# Patient Record
Sex: FEMALE | Race: WHITE | NOT HISPANIC OR LATINO | Employment: FULL TIME | URBAN - NONMETROPOLITAN AREA
[De-identification: names, ages, dates, MRNs, and addresses within clinical notes are randomized per-mention and may not be internally consistent; named-entity substitution may affect disease eponyms.]

---
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
## 2024-09-24 ENCOUNTER — TRANSCRIBE ORDERS (OUTPATIENT)
Dept: LAB | Facility: HOSPITAL | Age: 24
End: 2024-09-24
Payer: COMMERCIAL

## 2024-09-24 ENCOUNTER — LAB (OUTPATIENT)
Dept: LAB | Facility: HOSPITAL | Age: 24
End: 2024-09-24
Payer: COMMERCIAL

## 2024-09-24 DIAGNOSIS — Z3A.13 13 WEEKS GESTATION OF PREGNANCY: ICD-10-CM

## 2024-09-24 DIAGNOSIS — Z3A.13 13 WEEKS GESTATION OF PREGNANCY: Primary | ICD-10-CM

## 2024-09-24 LAB
ABO GROUP BLD: NORMAL
AMPHET+METHAMPHET UR QL: NEGATIVE
AMPHETAMINES UR QL: NEGATIVE
BACTERIA UR QL AUTO: NORMAL /HPF
BARBITURATES UR QL SCN: NEGATIVE
BENZODIAZ UR QL SCN: NEGATIVE
BILIRUB UR QL STRIP: NEGATIVE
BLD GP AB SCN SERPL QL: NEGATIVE
BUPRENORPHINE SERPL-MCNC: NEGATIVE NG/ML
CANNABINOIDS SERPL QL: NEGATIVE
CLARITY UR: CLEAR
COCAINE UR QL: NEGATIVE
COLOR UR: YELLOW
DEPRECATED RDW RBC AUTO: 39.7 FL (ref 37–54)
ERYTHROCYTE [DISTWIDTH] IN BLOOD BY AUTOMATED COUNT: 12.4 % (ref 12.3–15.4)
FENTANYL UR-MCNC: NEGATIVE NG/ML
GLUCOSE UR STRIP-MCNC: NEGATIVE MG/DL
HBV SURFACE AG SERPL QL IA: NORMAL
HCT VFR BLD AUTO: 40.7 % (ref 34–46.6)
HGB BLD-MCNC: 13.7 G/DL (ref 12–15.9)
HGB UR QL STRIP.AUTO: NEGATIVE
HIV 1+2 AB+HIV1 P24 AG SERPL QL IA: NORMAL
HYALINE CASTS UR QL AUTO: NORMAL /LPF
KETONES UR QL STRIP: NEGATIVE
LEUKOCYTE ESTERASE UR QL STRIP.AUTO: NEGATIVE
MCH RBC QN AUTO: 29.3 PG (ref 26.6–33)
MCHC RBC AUTO-ENTMCNC: 33.7 G/DL (ref 31.5–35.7)
MCV RBC AUTO: 87 FL (ref 79–97)
METHADONE UR QL SCN: NEGATIVE
NITRITE UR QL STRIP: NEGATIVE
OPIATES UR QL: NEGATIVE
OXYCODONE UR QL SCN: NEGATIVE
PCP UR QL SCN: NEGATIVE
PH UR STRIP.AUTO: 7 [PH] (ref 5–8)
PLATELET # BLD AUTO: 343 10*3/MM3 (ref 140–450)
PMV BLD AUTO: 10.5 FL (ref 6–12)
PROT UR QL STRIP: NEGATIVE
RBC # BLD AUTO: 4.68 10*6/MM3 (ref 3.77–5.28)
RBC # UR STRIP: NORMAL /HPF
REF LAB TEST METHOD: NORMAL
RH BLD: POSITIVE
SP GR UR STRIP: 1.02 (ref 1–1.03)
SQUAMOUS #/AREA URNS HPF: NORMAL /HPF
TREPONEMA PALLIDUM IGG+IGM AB [PRESENCE] IN SERUM OR PLASMA BY IMMUNOASSAY: NORMAL
TRICYCLICS UR QL SCN: NEGATIVE
UROBILINOGEN UR QL STRIP: NORMAL
WBC # UR STRIP: NORMAL /HPF
WBC NRBC COR # BLD AUTO: 9.67 10*3/MM3 (ref 3.4–10.8)

## 2024-09-24 PROCEDURE — 86901 BLOOD TYPING SEROLOGIC RH(D): CPT

## 2024-09-24 PROCEDURE — 87661 TRICHOMONAS VAGINALIS AMPLIF: CPT

## 2024-09-24 PROCEDURE — 85027 COMPLETE CBC AUTOMATED: CPT

## 2024-09-24 PROCEDURE — 80307 DRUG TEST PRSMV CHEM ANLYZR: CPT

## 2024-09-24 PROCEDURE — 87491 CHLMYD TRACH DNA AMP PROBE: CPT

## 2024-09-24 PROCEDURE — 86900 BLOOD TYPING SEROLOGIC ABO: CPT

## 2024-09-24 PROCEDURE — 86787 VARICELLA-ZOSTER ANTIBODY: CPT

## 2024-09-24 PROCEDURE — 36415 COLL VENOUS BLD VENIPUNCTURE: CPT

## 2024-09-24 PROCEDURE — 86762 RUBELLA ANTIBODY: CPT

## 2024-09-24 PROCEDURE — 87591 N.GONORRHOEAE DNA AMP PROB: CPT

## 2024-09-24 PROCEDURE — 86850 RBC ANTIBODY SCREEN: CPT

## 2024-09-24 PROCEDURE — 81001 URINALYSIS AUTO W/SCOPE: CPT

## 2024-09-25 LAB
BACTERIA SPEC AEROBE CULT: NO GROWTH
RUBV IGG SERPL IA-ACNC: <0.9 INDEX
VZV IGG SER IA-ACNC: NON REACTIVE

## 2024-09-26 LAB
C TRACH RRNA SPEC QL NAA+PROBE: NEGATIVE
N GONORRHOEA RRNA SPEC QL NAA+PROBE: NEGATIVE
T VAGINALIS RRNA SPEC QL NAA+PROBE: NEGATIVE

## 2025-01-03 ENCOUNTER — TRANSCRIBE ORDERS (OUTPATIENT)
Dept: LAB | Facility: HOSPITAL | Age: 25
End: 2025-01-03
Payer: COMMERCIAL

## 2025-01-03 ENCOUNTER — LAB (OUTPATIENT)
Dept: LAB | Facility: HOSPITAL | Age: 25
End: 2025-01-03
Payer: COMMERCIAL

## 2025-01-03 DIAGNOSIS — Z34.82 PRENATAL CARE, SUBSEQUENT PREGNANCY, SECOND TRIMESTER: Primary | ICD-10-CM

## 2025-01-03 DIAGNOSIS — Z34.82 PRENATAL CARE, SUBSEQUENT PREGNANCY, SECOND TRIMESTER: ICD-10-CM

## 2025-01-03 LAB
DEPRECATED RDW RBC AUTO: 41 FL (ref 37–54)
ERYTHROCYTE [DISTWIDTH] IN BLOOD BY AUTOMATED COUNT: 13.1 % (ref 12.3–15.4)
GLUCOSE 1H P 100 G GLC PO SERPL-MCNC: 119 MG/DL (ref 65–139)
HCT VFR BLD AUTO: 37.2 % (ref 34–46.6)
HGB BLD-MCNC: 12.2 G/DL (ref 12–15.9)
MCH RBC QN AUTO: 28.4 PG (ref 26.6–33)
MCHC RBC AUTO-ENTMCNC: 32.8 G/DL (ref 31.5–35.7)
MCV RBC AUTO: 86.5 FL (ref 79–97)
PLATELET # BLD AUTO: 285 10*3/MM3 (ref 140–450)
PMV BLD AUTO: 10.8 FL (ref 6–12)
RBC # BLD AUTO: 4.3 10*6/MM3 (ref 3.77–5.28)
TREPONEMA PALLIDUM IGG+IGM AB [PRESENCE] IN SERUM OR PLASMA BY IMMUNOASSAY: NORMAL
WBC NRBC COR # BLD AUTO: 10.47 10*3/MM3 (ref 3.4–10.8)

## 2025-01-03 PROCEDURE — 82948 REAGENT STRIP/BLOOD GLUCOSE: CPT | Performed by: ADVANCED PRACTICE MIDWIFE

## 2025-01-03 PROCEDURE — 86780 TREPONEMA PALLIDUM: CPT

## 2025-01-03 PROCEDURE — 82950 GLUCOSE TEST: CPT

## 2025-01-03 PROCEDURE — 85027 COMPLETE CBC AUTOMATED: CPT

## 2025-01-03 PROCEDURE — 36415 COLL VENOUS BLD VENIPUNCTURE: CPT

## 2025-03-22 ENCOUNTER — ANESTHESIA (OUTPATIENT)
Dept: LABOR AND DELIVERY | Facility: HOSPITAL | Age: 25
End: 2025-03-22
Payer: COMMERCIAL

## 2025-03-22 ENCOUNTER — HOSPITAL ENCOUNTER (INPATIENT)
Facility: HOSPITAL | Age: 25
LOS: 2 days | Discharge: HOME | End: 2025-03-24
Attending: STUDENT IN AN ORGANIZED HEALTH CARE EDUCATION/TRAINING PROGRAM | Admitting: STUDENT IN AN ORGANIZED HEALTH CARE EDUCATION/TRAINING PROGRAM
Payer: COMMERCIAL

## 2025-03-22 ENCOUNTER — ANESTHESIA EVENT (OUTPATIENT)
Dept: LABOR AND DELIVERY | Facility: HOSPITAL | Age: 25
End: 2025-03-22
Payer: COMMERCIAL

## 2025-03-22 PROBLEM — Z37.9 NORMAL LABOR: Status: RESOLVED | Noted: 2025-03-22 | Resolved: 2025-03-22

## 2025-03-22 PROBLEM — Z37.9 NORMAL LABOR: Status: ACTIVE | Noted: 2025-03-22

## 2025-03-22 LAB
ABO GROUP BLD: NORMAL
BLD GP AB SCN SERPL QL: NEGATIVE
DEPRECATED RDW RBC AUTO: 41.9 FL (ref 37–54)
ERYTHROCYTE [DISTWIDTH] IN BLOOD BY AUTOMATED COUNT: 14.4 % (ref 12.3–15.4)
EXTERNAL GROUP B STREP ANTIGEN: NEGATIVE
HCT VFR BLD AUTO: 33.9 % (ref 34–46.6)
HGB BLD-MCNC: 11.2 G/DL (ref 12–15.9)
MCH RBC QN AUTO: 26.9 PG (ref 26.6–33)
MCHC RBC AUTO-ENTMCNC: 33 G/DL (ref 31.5–35.7)
MCV RBC AUTO: 81.5 FL (ref 79–97)
PLATELET # BLD AUTO: 295 10*3/MM3 (ref 140–450)
PMV BLD AUTO: 11 FL (ref 6–12)
RBC # BLD AUTO: 4.16 10*6/MM3 (ref 3.77–5.28)
RH BLD: POSITIVE
T&S EXPIRATION DATE: NORMAL
TREPONEMA PALLIDUM IGG+IGM AB [PRESENCE] IN SERUM OR PLASMA BY IMMUNOASSAY: NORMAL
WBC NRBC COR # BLD AUTO: 11.29 10*3/MM3 (ref 3.4–10.8)

## 2025-03-22 PROCEDURE — 86780 TREPONEMA PALLIDUM: CPT | Performed by: ADVANCED PRACTICE MIDWIFE

## 2025-03-22 PROCEDURE — 25810000003 SODIUM CHLORIDE 0.9 % SOLUTION 500 ML FLEX CONT: Performed by: ADVANCED PRACTICE MIDWIFE

## 2025-03-22 PROCEDURE — 51703 INSERT BLADDER CATH COMPLEX: CPT

## 2025-03-22 PROCEDURE — 86803 HEPATITIS C AB TEST: CPT | Performed by: ADVANCED PRACTICE MIDWIFE

## 2025-03-22 PROCEDURE — 85027 COMPLETE CBC AUTOMATED: CPT | Performed by: ADVANCED PRACTICE MIDWIFE

## 2025-03-22 PROCEDURE — 36415 COLL VENOUS BLD VENIPUNCTURE: CPT | Performed by: ADVANCED PRACTICE MIDWIFE

## 2025-03-22 PROCEDURE — 86900 BLOOD TYPING SEROLOGIC ABO: CPT | Performed by: ADVANCED PRACTICE MIDWIFE

## 2025-03-22 PROCEDURE — 25010000002 LIDOCAINE-EPINEPHRINE (PF) 1.5 %-1:200000 SOLUTION: Performed by: ANESTHESIOLOGY

## 2025-03-22 PROCEDURE — 59025 FETAL NON-STRESS TEST: CPT

## 2025-03-22 PROCEDURE — 86850 RBC ANTIBODY SCREEN: CPT | Performed by: ADVANCED PRACTICE MIDWIFE

## 2025-03-22 PROCEDURE — 25010000002 ROPIVACAINE PER 1 MG: Performed by: ANESTHESIOLOGY

## 2025-03-22 PROCEDURE — 86901 BLOOD TYPING SEROLOGIC RH(D): CPT | Performed by: ADVANCED PRACTICE MIDWIFE

## 2025-03-22 PROCEDURE — 25010000002 FENTANYL CITRATE (PF) 50 MCG/ML SOLUTION: Performed by: ANESTHESIOLOGY

## 2025-03-22 PROCEDURE — 25010000002 ONDANSETRON PER 1 MG: Performed by: ADVANCED PRACTICE MIDWIFE

## 2025-03-22 PROCEDURE — C1755 CATHETER, INTRASPINAL: HCPCS

## 2025-03-22 PROCEDURE — C1755 CATHETER, INTRASPINAL: HCPCS | Performed by: ANESTHESIOLOGY

## 2025-03-22 RX ORDER — LIDOCAINE HYDROCHLORIDE AND EPINEPHRINE 15; 5 MG/ML; UG/ML
INJECTION, SOLUTION EPIDURAL AS NEEDED
Status: DISCONTINUED | OUTPATIENT
Start: 2025-03-22 | End: 2025-03-22 | Stop reason: SURG

## 2025-03-22 RX ORDER — IBUPROFEN 600 MG/1
600 TABLET, FILM COATED ORAL EVERY 6 HOURS SCHEDULED
Status: DISCONTINUED | OUTPATIENT
Start: 2025-03-22 | End: 2025-03-22 | Stop reason: HOSPADM

## 2025-03-22 RX ORDER — CITRIC ACID/SODIUM CITRATE 334-500MG
30 SOLUTION, ORAL ORAL ONCE
Status: DISCONTINUED | OUTPATIENT
Start: 2025-03-22 | End: 2025-03-22 | Stop reason: HOSPADM

## 2025-03-22 RX ORDER — MAGNESIUM CARB/ALUMINUM HYDROX 105-160MG
30 TABLET,CHEWABLE ORAL ONCE
Status: DISCONTINUED | OUTPATIENT
Start: 2025-03-22 | End: 2025-03-22 | Stop reason: HOSPADM

## 2025-03-22 RX ORDER — OXYTOCIN/0.9 % SODIUM CHLORIDE 30/500 ML
250 PLASTIC BAG, INJECTION (ML) INTRAVENOUS CONTINUOUS
Status: ACTIVE | OUTPATIENT
Start: 2025-03-22 | End: 2025-03-22

## 2025-03-22 RX ORDER — DIPHENHYDRAMINE HYDROCHLORIDE 50 MG/ML
12.5 INJECTION, SOLUTION INTRAMUSCULAR; INTRAVENOUS EVERY 8 HOURS PRN
Status: DISCONTINUED | OUTPATIENT
Start: 2025-03-22 | End: 2025-03-22 | Stop reason: HOSPADM

## 2025-03-22 RX ORDER — FAMOTIDINE 10 MG/ML
20 INJECTION, SOLUTION INTRAVENOUS ONCE AS NEEDED
Status: DISCONTINUED | OUTPATIENT
Start: 2025-03-22 | End: 2025-03-22 | Stop reason: HOSPADM

## 2025-03-22 RX ORDER — LIDOCAINE HYDROCHLORIDE 10 MG/ML
0.5 INJECTION, SOLUTION EPIDURAL; INFILTRATION; INTRACAUDAL; PERINEURAL ONCE AS NEEDED
Status: DISCONTINUED | OUTPATIENT
Start: 2025-03-22 | End: 2025-03-22 | Stop reason: HOSPADM

## 2025-03-22 RX ORDER — FAMOTIDINE 20 MG/1
20 TABLET, FILM COATED ORAL 2 TIMES DAILY PRN
Status: DISCONTINUED | OUTPATIENT
Start: 2025-03-22 | End: 2025-03-22 | Stop reason: HOSPADM

## 2025-03-22 RX ORDER — SODIUM CHLORIDE 0.9 % (FLUSH) 0.9 %
10 SYRINGE (ML) INJECTION EVERY 12 HOURS SCHEDULED
Status: DISCONTINUED | OUTPATIENT
Start: 2025-03-22 | End: 2025-03-22 | Stop reason: HOSPADM

## 2025-03-22 RX ORDER — SODIUM CHLORIDE, SODIUM LACTATE, POTASSIUM CHLORIDE, CALCIUM CHLORIDE 600; 310; 30; 20 MG/100ML; MG/100ML; MG/100ML; MG/100ML
125 INJECTION, SOLUTION INTRAVENOUS CONTINUOUS
Status: ACTIVE | OUTPATIENT
Start: 2025-03-22 | End: 2025-03-23

## 2025-03-22 RX ORDER — FAMOTIDINE 10 MG/ML
20 INJECTION, SOLUTION INTRAVENOUS 2 TIMES DAILY PRN
Status: DISCONTINUED | OUTPATIENT
Start: 2025-03-22 | End: 2025-03-22 | Stop reason: HOSPADM

## 2025-03-22 RX ORDER — ONDANSETRON 2 MG/ML
4 INJECTION INTRAMUSCULAR; INTRAVENOUS ONCE AS NEEDED
Status: DISCONTINUED | OUTPATIENT
Start: 2025-03-22 | End: 2025-03-22 | Stop reason: HOSPADM

## 2025-03-22 RX ORDER — EPHEDRINE SULFATE 5 MG/ML
10 INJECTION INTRAVENOUS
Status: DISCONTINUED | OUTPATIENT
Start: 2025-03-22 | End: 2025-03-22 | Stop reason: HOSPADM

## 2025-03-22 RX ORDER — PROMETHAZINE HYDROCHLORIDE 12.5 MG/1
12.5 SUPPOSITORY RECTAL EVERY 6 HOURS PRN
Status: DISCONTINUED | OUTPATIENT
Start: 2025-03-22 | End: 2025-03-22 | Stop reason: HOSPADM

## 2025-03-22 RX ORDER — DOCUSATE SODIUM 100 MG/1
100 CAPSULE, LIQUID FILLED ORAL 2 TIMES DAILY
Status: DISCONTINUED | OUTPATIENT
Start: 2025-03-22 | End: 2025-03-24 | Stop reason: HOSPADM

## 2025-03-22 RX ORDER — ACETAMINOPHEN 325 MG/1
650 TABLET ORAL EVERY 6 HOURS PRN
Status: DISCONTINUED | OUTPATIENT
Start: 2025-03-22 | End: 2025-03-24 | Stop reason: HOSPADM

## 2025-03-22 RX ORDER — HYDROCODONE BITARTRATE AND ACETAMINOPHEN 5; 325 MG/1; MG/1
1 TABLET ORAL EVERY 4 HOURS PRN
Status: DISCONTINUED | OUTPATIENT
Start: 2025-03-22 | End: 2025-03-24 | Stop reason: HOSPADM

## 2025-03-22 RX ORDER — OXYTOCIN/0.9 % SODIUM CHLORIDE 30/500 ML
125 PLASTIC BAG, INJECTION (ML) INTRAVENOUS ONCE AS NEEDED
Status: DISCONTINUED | OUTPATIENT
Start: 2025-03-22 | End: 2025-03-24 | Stop reason: HOSPADM

## 2025-03-22 RX ORDER — HYDROCODONE BITARTRATE AND ACETAMINOPHEN 10; 325 MG/1; MG/1
1 TABLET ORAL EVERY 4 HOURS PRN
Status: DISCONTINUED | OUTPATIENT
Start: 2025-03-22 | End: 2025-03-24 | Stop reason: HOSPADM

## 2025-03-22 RX ORDER — ONDANSETRON 4 MG/1
4 TABLET, ORALLY DISINTEGRATING ORAL EVERY 6 HOURS PRN
Status: DISCONTINUED | OUTPATIENT
Start: 2025-03-22 | End: 2025-03-22 | Stop reason: HOSPADM

## 2025-03-22 RX ORDER — ACETAMINOPHEN 325 MG/1
650 TABLET ORAL EVERY 4 HOURS PRN
Status: DISCONTINUED | OUTPATIENT
Start: 2025-03-22 | End: 2025-03-22 | Stop reason: HOSPADM

## 2025-03-22 RX ORDER — CARBOPROST TROMETHAMINE 250 UG/ML
250 INJECTION, SOLUTION INTRAMUSCULAR
Status: DISCONTINUED | OUTPATIENT
Start: 2025-03-22 | End: 2025-03-22 | Stop reason: HOSPADM

## 2025-03-22 RX ORDER — HYDROCORTISONE 25 MG/G
1 CREAM TOPICAL AS NEEDED
Status: DISCONTINUED | OUTPATIENT
Start: 2025-03-22 | End: 2025-03-24 | Stop reason: HOSPADM

## 2025-03-22 RX ORDER — SODIUM CHLORIDE 9 MG/ML
40 INJECTION, SOLUTION INTRAVENOUS AS NEEDED
Status: DISCONTINUED | OUTPATIENT
Start: 2025-03-22 | End: 2025-03-22 | Stop reason: HOSPADM

## 2025-03-22 RX ORDER — MISOPROSTOL 200 UG/1
800 TABLET ORAL ONCE AS NEEDED
Status: DISCONTINUED | OUTPATIENT
Start: 2025-03-22 | End: 2025-03-22 | Stop reason: HOSPADM

## 2025-03-22 RX ORDER — ROPIVACAINE HYDROCHLORIDE 2 MG/ML
15 INJECTION, SOLUTION EPIDURAL; INFILTRATION; PERINEURAL CONTINUOUS
Status: DISCONTINUED | OUTPATIENT
Start: 2025-03-22 | End: 2025-03-24 | Stop reason: HOSPADM

## 2025-03-22 RX ORDER — METOCLOPRAMIDE HYDROCHLORIDE 5 MG/ML
10 INJECTION INTRAMUSCULAR; INTRAVENOUS ONCE AS NEEDED
Status: DISCONTINUED | OUTPATIENT
Start: 2025-03-22 | End: 2025-03-22 | Stop reason: HOSPADM

## 2025-03-22 RX ORDER — SODIUM CHLORIDE 0.9 % (FLUSH) 0.9 %
10 SYRINGE (ML) INJECTION AS NEEDED
Status: DISCONTINUED | OUTPATIENT
Start: 2025-03-22 | End: 2025-03-22 | Stop reason: HOSPADM

## 2025-03-22 RX ORDER — PROMETHAZINE HYDROCHLORIDE 12.5 MG/1
12.5 TABLET ORAL EVERY 6 HOURS PRN
Status: DISCONTINUED | OUTPATIENT
Start: 2025-03-22 | End: 2025-03-22 | Stop reason: HOSPADM

## 2025-03-22 RX ORDER — METHYLERGONOVINE MALEATE 0.2 MG/ML
200 INJECTION INTRAVENOUS ONCE AS NEEDED
Status: DISCONTINUED | OUTPATIENT
Start: 2025-03-22 | End: 2025-03-22 | Stop reason: HOSPADM

## 2025-03-22 RX ORDER — EPHEDRINE SULFATE 5 MG/ML
INJECTION INTRAVENOUS
Status: COMPLETED
Start: 2025-03-22 | End: 2025-03-22

## 2025-03-22 RX ORDER — FENTANYL CITRATE 50 UG/ML
INJECTION, SOLUTION INTRAMUSCULAR; INTRAVENOUS AS NEEDED
Status: DISCONTINUED | OUTPATIENT
Start: 2025-03-22 | End: 2025-03-22 | Stop reason: SURG

## 2025-03-22 RX ORDER — ONDANSETRON 2 MG/ML
4 INJECTION INTRAMUSCULAR; INTRAVENOUS EVERY 6 HOURS PRN
Status: DISCONTINUED | OUTPATIENT
Start: 2025-03-22 | End: 2025-03-22 | Stop reason: HOSPADM

## 2025-03-22 RX ORDER — OXYTOCIN/0.9 % SODIUM CHLORIDE 30/500 ML
999 PLASTIC BAG, INJECTION (ML) INTRAVENOUS ONCE
Status: COMPLETED | OUTPATIENT
Start: 2025-03-22 | End: 2025-03-22

## 2025-03-22 RX ORDER — ROPIVACAINE HYDROCHLORIDE 5 MG/ML
INJECTION, SOLUTION EPIDURAL; INFILTRATION; PERINEURAL AS NEEDED
Status: DISCONTINUED | OUTPATIENT
Start: 2025-03-22 | End: 2025-03-22 | Stop reason: SURG

## 2025-03-22 RX ORDER — SODIUM CHLORIDE 0.9 % (FLUSH) 0.9 %
1-10 SYRINGE (ML) INJECTION AS NEEDED
Status: DISCONTINUED | OUTPATIENT
Start: 2025-03-22 | End: 2025-03-24 | Stop reason: HOSPADM

## 2025-03-22 RX ORDER — IBUPROFEN 600 MG/1
600 TABLET, FILM COATED ORAL EVERY 6 HOURS PRN
Status: DISCONTINUED | OUTPATIENT
Start: 2025-03-22 | End: 2025-03-24 | Stop reason: HOSPADM

## 2025-03-22 RX ORDER — ONDANSETRON 2 MG/ML
4 INJECTION INTRAMUSCULAR; INTRAVENOUS EVERY 6 HOURS PRN
Status: DISCONTINUED | OUTPATIENT
Start: 2025-03-22 | End: 2025-03-24 | Stop reason: HOSPADM

## 2025-03-22 RX ORDER — BISACODYL 10 MG
10 SUPPOSITORY, RECTAL RECTAL DAILY PRN
Status: DISCONTINUED | OUTPATIENT
Start: 2025-03-23 | End: 2025-03-24 | Stop reason: HOSPADM

## 2025-03-22 RX ADMIN — ROPIVACAINE HYDROCHLORIDE 2 ML: 5 INJECTION, SOLUTION EPIDURAL; INFILTRATION; PERINEURAL at 18:03

## 2025-03-22 RX ADMIN — EPHEDRINE SULFATE 10 MG: 5 INJECTION INTRAVENOUS at 18:26

## 2025-03-22 RX ADMIN — SODIUM CHLORIDE 40 ML: 9 INJECTION, SOLUTION INTRAVENOUS at 19:53

## 2025-03-22 RX ADMIN — LIDOCAINE HYDROCHLORIDE AND EPINEPHRINE 2 ML: 15; 5 INJECTION, SOLUTION EPIDURAL at 17:56

## 2025-03-22 RX ADMIN — ROPIVACAINE HYDROCHLORIDE 15 ML/HR: 2 INJECTION, SOLUTION EPIDURAL; INFILTRATION at 18:04

## 2025-03-22 RX ADMIN — Medication 999 ML/HR: at 19:15

## 2025-03-22 RX ADMIN — ROPIVACAINE HYDROCHLORIDE 6 ML: 5 INJECTION, SOLUTION EPIDURAL; INFILTRATION; PERINEURAL at 18:00

## 2025-03-22 RX ADMIN — DOCUSATE SODIUM 100 MG: 100 CAPSULE, LIQUID FILLED ORAL at 22:58

## 2025-03-22 RX ADMIN — Medication: at 22:58

## 2025-03-22 RX ADMIN — FENTANYL CITRATE 100 MCG: 50 INJECTION, SOLUTION INTRAMUSCULAR; INTRAVENOUS at 18:00

## 2025-03-22 RX ADMIN — WITCH HAZEL: 500 SOLUTION RECTAL; TOPICAL at 22:58

## 2025-03-22 RX ADMIN — ONDANSETRON 4 MG: 2 INJECTION INTRAMUSCULAR; INTRAVENOUS at 18:38

## 2025-03-22 RX ADMIN — LIDOCAINE HYDROCHLORIDE AND EPINEPHRINE 3 ML: 15; 5 INJECTION, SOLUTION EPIDURAL at 17:53

## 2025-03-22 NOTE — ANESTHESIA PREPROCEDURE EVALUATION
Anesthesia Evaluation     Patient summary reviewed and Nursing notes reviewed                Airway   Mallampati: II  TM distance: >3 FB  Neck ROM: full  Dental - normal exam     Pulmonary - negative pulmonary ROS   Cardiovascular - negative cardio ROS        Neuro/Psych- negative ROS  GI/Hepatic/Renal/Endo    (+) obesity, morbid obesity    Musculoskeletal (-) negative ROS    Abdominal    Substance History - negative use     OB/GYN    (+) Pregnant        Other - negative ROS                   Anesthesia Plan    ASA 2     epidural       Anesthetic plan, risks, benefits, and alternatives have been provided, discussed and informed consent has been obtained with: patient.    CODE STATUS:    Code Status (Patient has no pulse and is not breathing): CPR (Attempt to Resuscitate)  Medical Interventions (Patient has pulse or is breathing): Full Support  Level Of Support Discussed With: Patient

## 2025-03-22 NOTE — H&P
[facility]  Obstetric History and Physical    Chief Complaint   Patient presents with    Laboring       Subjective     Patient is a 25 y.o. female  currently at 38w5d, who presents for term labor  without ROM. On intake reports contractions, denies  leakage of fluid, denies  vaginal bleeding. denies  fetal movement.    Her prenatal care is benign.  Her previous obstetric/gynecological history is noted for is remarkable for  x 3    The following portions of the patients history were reviewed and updated as appropriate: past medical history, past surgical history, past family history, and past social history .       Prenatal Information:  Prenatal Results       Initial Prenatal Labs       Test Value Reference Range Date Time    Hemoglobin  13.7 g/dL 12.0 - 15.9 24 1104    Hematocrit  40.7 % 34.0 - 46.6 24 1104    Platelets  343 10*3/mm3 140 - 450 24 1104    Rubella IgG  <0.90 index Immune >0.99 24 1104    Hepatitis B SAg  Non-Reactive  Non-Reactive 24 1104    Hepatitis C Ab        RPR        T. Pallidum Ab   Non-Reactive  Non-Reactive 25 1012       Non-Reactive  Non-Reactive 24 1104    ABO  O   24 1104    Rh  Positive   24 1104    Antibody Screen  Negative   24 1104    HIV  Non-Reactive  Non-Reactive 24 1104    Urine Culture  No growth   24 1104    Gonorrhea  Negative  Negative 24 1104    Chlamydia  Negative  Negative 24 1104    TSH        HgB A1c         Varicella IgG  Non Reactive  Non Reactive 24 1104    Hemoglobinopathy Fractionation        Hemoglobinopathy (genetic testing)        Cystic fibrosis         Spinal muscular atrophy        Fragile X                  Fetal testing        Test Value Reference Range Date Time    NIPT        MSAFP        AFP-4                  2nd and 3rd Trimester       Test Value Reference Range Date Time    Hemoglobin (repeated)  11.2 g/dL 12.0 - 15.9 25 1327       12.2 g/dL  12.0 - 15.9 25 1012    Hematocrit (repeated)  33.9 % 34.0 - 46.6 25 1327       37.2 % 34.0 - 46.6 25 1012    Platelets   295 10*3/mm3 140 - 450 25 1327       285 10*3/mm3 140 - 450 25 1012       343 10*3/mm3 140 - 450 24 1104    1 hour GTT   119 mg/dL 65 - 139 25 1012    Antibody Screen (repeated)        3rd TM syphilis scrn (repeated)  RPR         3rd TM syphilis scrn (repeated) TP-Ab  Non-Reactive  Non-Reactive 25 1012    3rd TM syphilis screen TB-Ab (FTA)  Non-Reactive  Non-Reactive 25 1012    Syphilis cascade test TP-Ab (EIA)        Syphilis cascade TPPA        GTT Fasting        GTT 1 Hr        GTT 2 Hr        GTT 3 Hr        Group B Strep ^ Negative   25               Other testing        Test Value Reference Range Date Time    Parvo IgG         CMV IgG                   Drug Screening       Test Value Reference Range Date Time    Amphetamine Screen  Negative  Negative 24 1104    Barbiturate Screen  Negative  Negative 24 1104    Benzodiazepine Screen  Negative  Negative 24 1104    Methadone Screen  Negative  Negative 24 1104    Phencyclidine Screen  Negative  Negative 24 1104    Opiates Screen  Negative  Negative 24 1104    THC Screen  Negative  Negative 24 1104    Cocaine Screen  Negative  Negative 24 1104    Propoxyphene Screen        Buprenorphine Screen  Negative  Negative 24 1104    Methamphetamine Screen  Negative  Negative 24 1104    Oxycodone Screen  Negative  Negative 24 1104    Tricyclic Antidepressants Screen  Negative  Negative 24 1104              Legend    ^: Historical                               Past OB History:       OB History    Para Term  AB Living   4 3 2 1 0 3   SAB IAB Ectopic Molar Multiple Live Births   0 0 0 0 0 0      # Outcome Date GA Lbr [place]/2nd Weight Sex Type Anes PTL Lv   4 Current            3             2 Term            1  Term                Past Medical History: History reviewed. No pertinent past medical history.   Past Surgical History Past Surgical History:   Procedure Laterality Date    ADENOIDECTOMY      EAR TUBES Bilateral       Family History: History reviewed. No pertinent family history.   Social History:  reports that she has never smoked. She does not have any smokeless tobacco history on file.   reports no history of alcohol use.   reports no history of drug use.        REVIEW OF SYSTEMS              Reports fetal movement is normal             Denies leakage of amniotic fluid.             Denies vaginal bleeding             She reports Regular contractions, frequency: Every 5-7 minutes, intensity moderate             All other systems reviewed and are negative    Objective       Vital Signs Range for the last 24 hours  Temperature: Temp:  [98.1 °F (36.7 °C)-98.8 °F (37.1 °C)] 98.1 °F (36.7 °C)   Temp Source: Temp src: Oral   BP: BP: (124-138)/(66-86) 134/75   Pulse: Heart Rate:  [] 109   Respirations: Resp:  [16-22] 22   SPO2:     O2 Amount (l/min):     O2 Devices     Weight: Weight:  [111 kg (245 lb)] 111 kg (245 lb)       Presentation: vertex   Cervix: Exam by: Method: sterile vaginal exam performedCherry Torrez   Dilation: 5   Effacement: Cervical Effacement: 80   Station: Fetal Station: -1        Fetal Heart Rate Assessment   Method: Fetal HR Assessment Method: external   Beats/min: Fetal HR (beats/min): 150   Baseline: Fetal HR Baseline: normal range   Varibility: Fetal HR Variability: moderate (amplitude range 6 to 25 bpm)   Accels: Fetal HR Accelerations: greater than/equal to 15 bpm, lasting at least 15 seconds   Decels: Fetal HR Decelerations: absent   Tracing Category:       Uterine Assessment   Method: Method: external tocotransducer   Frequency (min): Contraction Frequency (Minutes): poor tracing   Ctx Count in 10 min:     Duration:     Intensity: Contraction Intensity: no contractions   Intensity by  IUPC:     Resting Tone: Uterine Resting Tone: soft by palpation   Resting Tone by IUPC:     [place] Units:         Constitutional:  Well developed, well nourished, no acute distress, well-groomed.   Respiratory:  Lungs are clear to auscultation bilaterally, normal breath sounds.   Cardiovascular:  Normal rate and rhythm, no murmurs.   Gastrointestinal:  Soft, gravid, nontender.  Uterus: Soft, nontender. Fundus appropriate for dates.  Neurologic:  Alert & oriented x 3,  no focal deficits noted.   Psychiatric:  Speech and behavior appropriate.   Extremities: no cyanosis, clubbing or edema, no evidence of DVT.        Labs:  Lab Results   Component Value Date    WBC 11.29 (H) 03/22/2025    HGB 11.2 (L) 03/22/2025    HCT 33.9 (L) 03/22/2025    MCV 81.5 03/22/2025     03/22/2025    CREATININE 0.50 (L) 03/05/2018    AST 35 03/05/2018    ALT 47 03/05/2018     Results from last 7 days   Lab Units 03/22/25  1327   ABO TYPING  O   RH TYPING  Positive   ANTIBODY SCREEN  Negative           Assessment & Plan       Normal labor        Assessment:   IUP at 38w5d weeks gestation with reactive fetal status.    2.   term labor  without ROM  3.   Obstetrical history significant for is non-contributory.  4.   GBS status:   Lab Results   Component Value Date    STREPGPB Negative 03/22/2025      5.   Rh: positive    Plan:  Expectant management  Continuous FHT and TOCO monitoring.   Diet: Clear liquids  Desires natural labor currently  Plan of care has been reviewed with patient and questions answered.  Risks, benefits of treatment plan have been discussed.   Consent obtained to proceed with labor management and subsequent delivery of baby.        Cherry Torrez CNM  3/22/2025  18:31 EDT

## 2025-03-22 NOTE — ANESTHESIA PROCEDURE NOTES
Labor Epidural      Patient reassessed immediately prior to procedure    Patient location during procedure: OB  Performed By  Anesthesiologist: Chantal Carbajal DO  Preanesthetic Checklist  Completed: patient identified, IV checked, risks and benefits discussed, surgical consent, monitors and equipment checked, pre-op evaluation and timeout performed  Additional Notes  CSE performed using 25g Ezio  Prep:  Pt Position:sitting  Sterile Tech:cap, gloves, mask and sterile barrier  Prep:chlorhexidine gluconate and isopropyl alcohol  Monitoring:blood pressure monitoring  Epidural Block Procedure:  Approach:midline  Guidance:palpation technique  Location:L3-L4  Needle Type:Tuohy  Needle Gauge:17 G  Loss of Resistance Medium: air  Loss of Resistance: 6cm  Cath Depth at skin:12 cm  Paresthesia: none  Aspiration:negative  Test Dose:negative  Number of Attempts: 1  Post Assessment:  Dressing:occlusive dressing applied and secured with tape  Pt Tolerance:patient tolerated the procedure well with no apparent complications  Complications:no

## 2025-03-22 NOTE — PROGRESS NOTES
"03/22/25  18:32 EDT  Charley Schneider      ASSESSMENTS:  AROM earlier with thin meconium noted.  Patient now has epidural and is comfortable    /75   Pulse 109   Temp 98.1 °F (36.7 °C) (Oral)   Resp 22   Ht 167.6 cm (66\")   Wt 111 kg (245 lb)   BMI 39.54 kg/m²     Fetal Heart Rate Assessment   Method: Fetal HR Assessment Method: external   Beats/min: Fetal HR (beats/min): 150   Baseline: Fetal HR Baseline: normal range   Varibility: Fetal HR Variability: moderate (amplitude range 6 to 25 bpm)   Accels: Fetal HR Accelerations: greater than/equal to 15 bpm, lasting at least 15 seconds   Decels: Fetal HR Decelerations: absent   Tracing Category: Category 1     Uterine Assessment   Method: Method: external tocotransducer   Frequency (min): Contraction Frequency (Minutes): poor tracing   Ctx Count in 10 min:     Duration:     Intensity: Contraction Intensity: no contractions   Intensity by IUPC:     Resting Tone: Uterine Resting Tone: soft by palpation   Resting Tone by IUPC:     [place] Units:                                Presentation: vertex   Cervix: Exam by: Method: sterile vaginal exam performed   Dilation: Cervical Dilation (cm): 5-6   Effacement: Cervical Effacement: 80   Station: Fetal Station: -1            Lab Results   Component Value Date    WBC 11.29 (H) 03/22/2025    HGB 11.2 (L) 03/22/2025    HCT 33.9 (L) 03/22/2025    MCV 81.5 03/22/2025     03/22/2025    CREATININE 0.50 (L) 03/05/2018    AST 35 03/05/2018    ALT 47 03/05/2018     Results from last 7 days   Lab Units 03/22/25  1327   ABO TYPING  O   RH TYPING  Positive   ANTIBODY SCREEN  Negative       PLAN: Allow patient to rest and await further progress    Cherry Torrez CNM  18:32 EDT  03/22/25  "

## 2025-03-22 NOTE — L&D DELIVERY NOTE
" [facility]   Vaginal Delivery Note    Patient Name: Charley Schneider  : 2000  MRN: [id number]    Date of Delivery: 3/22/2025    Diagnosis     Pre & Post-Delivery:  Intrauterine pregnancy at 38w5d  Labor status: Spontaneous Onset of Labor     (normal spontaneous vaginal delivery)             Problem List    Transfer to Postpartum     Review the Delivery Report for details.     Delivery     Delivery: Vaginal, Spontaneous    YOB: 2026   Time of Birth:  Gestational Age 7:12 PM  38w5d     Anesthesia: Epidural    Delivering clinician: Cherry Torrez   Forceps?   No   Vacuum? No    Shoulder dystocia present: No        Delivery narrative:  Progressed to complete at 38w5d and a viable male infant delivered without maternal effort over an intact perineum. Anterior shoulder delivered with ease. Infant vigorous at delivery so placed on maternal abdomen. Delayed cord clamping x 1 min. Cord doubly clamped and cut. Cord blood and cord segment obtained. Placenta delivered spontaneously and appeared intact. Pitocin to IVF. Perineum inspected and found to be intact.      Infant     Findings: male infant     Infant observations: Weight: 3415 g (7 lb 8.5 oz)  Length: 19.75 in  Observations/Comments:        Apgars: 8  @ 1 minute /    9  @ 5 minutes   Infant Name: Boone     Placenta & Cord         Placenta delivered  Spontaneous at   3/22/2025  7:18 PM    Cord: Unknown present.   Nuchal Cord?  no   Cord blood obtained: Yes   Cord gases obtained:  No   Cord gas results: Venous:  No results found for: \"PHCVEN\", \"BECVEN\"    Arterial:  No results found for: \"PHCART\", \"BECART\"     Repair     Episiotomy: None    No    Lacerations: No   Estimated Blood Loss:  400 ml     Quantitative Blood Loss:          Complications     none    Disposition     Mother to Mother Baby/Postpartum  in stable condition currently.  Baby to remains with mom  in stable condition currently.    Cherry Torrez, " TIM  03/22/25  19:30 EDT

## 2025-03-22 NOTE — H&P
[facility]  Obstetric History and Physical    Chief Complaint   Patient presents with    Laboring       Subjective     Patient is a 25 y.o. female  currently at 38w5d, who presents for term labor  without ROM. On intake reports contractions, denies  leakage of fluid, denies  vaginal bleeding. reports fetal movement.    Her prenatal care is benign.  Her previous obstetric/gynecological history is noted for is remarkable for     The following portions of the patients history were reviewed and updated as appropriate: past medical history, past surgical history, past family history, and past social history .       Prenatal Information:  Prenatal Results       Initial Prenatal Labs       Test Value Reference Range Date Time    Hemoglobin  13.7 g/dL 12.0 - 15.9 24 1104    Hematocrit  40.7 % 34.0 - 46.6 24 1104    Platelets  343 10*3/mm3 140 - 450 24 1104    Rubella IgG  <0.90 index Immune >0.99 24 1104    Hepatitis B SAg  Non-Reactive  Non-Reactive 24 1104    Hepatitis C Ab        RPR        T. Pallidum Ab   Non-Reactive  Non-Reactive 25 1012       Non-Reactive  Non-Reactive 24 1104    ABO  O   24 1104    Rh  Positive   24 1104    Antibody Screen  Negative   24 1104    HIV  Non-Reactive  Non-Reactive 24 1104    Urine Culture  No growth   24 1104    Gonorrhea  Negative  Negative 24 1104    Chlamydia  Negative  Negative 24 1104    TSH        HgB A1c         Varicella IgG  Non Reactive  Non Reactive 24 1104    Hemoglobinopathy Fractionation        Hemoglobinopathy (genetic testing)        Cystic fibrosis         Spinal muscular atrophy        Fragile X                  Fetal testing        Test Value Reference Range Date Time    NIPT        MSAFP        AFP-4                  2nd and 3rd Trimester       Test Value Reference Range Date Time    Hemoglobin (repeated)  11.2 g/dL 12.0 - 15.9 25 1327       12.2 g/dL 12.0 -  15.9 25 1012    Hematocrit (repeated)  33.9 % 34.0 - 46.6 25 1327       37.2 % 34.0 - 46.6 25 1012    Platelets   295 10*3/mm3 140 - 450 25 1327       285 10*3/mm3 140 - 450 25 1012       343 10*3/mm3 140 - 450 24 1104    1 hour GTT   119 mg/dL 65 - 139 25 1012    Antibody Screen (repeated)        3rd TM syphilis scrn (repeated)  RPR         3rd TM syphilis scrn (repeated) TP-Ab  Non-Reactive  Non-Reactive 25 1012    3rd TM syphilis screen TB-Ab (FTA)  Non-Reactive  Non-Reactive 25 1012    Syphilis cascade test TP-Ab (EIA)        Syphilis cascade TPPA        GTT Fasting        GTT 1 Hr        GTT 2 Hr        GTT 3 Hr        Group B Strep ^ Negative   25               Other testing        Test Value Reference Range Date Time    Parvo IgG         CMV IgG                   Drug Screening       Test Value Reference Range Date Time    Amphetamine Screen  Negative  Negative 24 1104    Barbiturate Screen  Negative  Negative 24 1104    Benzodiazepine Screen  Negative  Negative 24 1104    Methadone Screen  Negative  Negative 24 1104    Phencyclidine Screen  Negative  Negative 24 1104    Opiates Screen  Negative  Negative 24 1104    THC Screen  Negative  Negative 24 1104    Cocaine Screen  Negative  Negative 24 1104    Propoxyphene Screen        Buprenorphine Screen  Negative  Negative 24 1104    Methamphetamine Screen  Negative  Negative 24 1104    Oxycodone Screen  Negative  Negative 24 1104    Tricyclic Antidepressants Screen  Negative  Negative 24 1104              Legend    ^: Historical                               Past OB History:       OB History    Para Term  AB Living   4 4 3 1 0 4   SAB IAB Ectopic Molar Multiple Live Births   0 0 0 0 0 1      # Outcome Date GA Lbr Keagan/2nd Weight Sex Type Anes PTL Lv   4 Term 25 38w5d  3415 g (7 lb 8.5 oz) M Vag-Spont EPI N VINCENT       Name: Reid Schneider      Apgar1: 8  Apgar5: 9   3             2 Term            1 Term                Past Medical History: History reviewed. No pertinent past medical history.   Past Surgical History Past Surgical History:   Procedure Laterality Date    ADENOIDECTOMY      EAR TUBES Bilateral       Family History: History reviewed. No pertinent family history.   Social History:  reports that she has never smoked. She does not have any smokeless tobacco history on file.   reports no history of alcohol use.   reports no history of drug use.        REVIEW OF SYSTEMS              Reports fetal movement is normal             Denies leakage of amniotic fluid.             Denies vaginal bleeding             She reports Regular contractions, frequency: Every 5-7 minutes, intensity mild             All other systems reviewed and are negative    Objective       Vital Signs Range for the last 24 hours  Temperature: Temp:  [97.9 °F (36.6 °C)-99 °F (37.2 °C)] 98.2 °F (36.8 °C)   Temp Source: Temp src: Oral   BP: BP: (101-138)/(56-86) 136/79   Pulse: Heart Rate:  [] 81   Respirations: Resp:  [16-22] 18   SPO2:     O2 Amount (l/min):     O2 Devices     Weight: Weight:  [111 kg (245 lb)] 111 kg (245 lb)       Presentation: vertex   Cervix: Exam by: Method: sterile vaginal exam performed   Dilation: 5   Effacement: 70   Station: -1        Fetal Heart Rate Assessment   Method: Fetal HR Assessment Method: external   Beats/min: Fetal HR (beats/min): 140   Baseline: Fetal HR Baseline: normal range   Varibility: Fetal HR Variability: moderate (amplitude range 6 to 25 bpm)   Accels: Fetal HR Accelerations: greater than/equal to 15 bpm, lasting at least 15 seconds   Decels: Fetal HR Decelerations: other (see comments) (decel noted)   Tracing Category:       Uterine Assessment   Method: Method: external tocotransducer   Frequency (min): Contraction Frequency (Minutes): difficult tracing   Ctx Count in 10 min: Contractions  in 10 Minutes: 1   Duration:     Intensity: Contraction Intensity: no contractions   Intensity by IUPC:     Resting Tone: Uterine Resting Tone: soft by palpation   Resting Tone by IUPC:     Alexey Units:         Constitutional:  Well developed, well nourished, no acute distress, well-groomed.   Respiratory:  Lungs are clear to auscultation bilaterally, normal breath sounds.   Cardiovascular:  Normal rate and rhythm, no murmurs.   Gastrointestinal:  Soft, gravid, nontender.  Uterus: Soft, nontender. Fundus appropriate for dates.  Neurologic:  Alert & oriented x 3,  no focal deficits noted.   Psychiatric:  Speech and behavior appropriate.   Extremities: no cyanosis, clubbing or edema, no evidence of DVT.        Labs:  Lab Results   Component Value Date    WBC 12.62 (H) 2025    HGB 9.8 (L) 2025    HCT 31.2 (L) 2025    MCV 85.7 2025     2025    CREATININE 0.50 (L) 2018    AST 35 2018    ALT 47 2018     Results from last 7 days   Lab Units 25  1327   ABO TYPING  O   RH TYPING  Positive   ANTIBODY SCREEN  Negative           Assessment & Plan        (normal spontaneous vaginal delivery)        Assessment:   IUP at 38w5d weeks gestation with reactive fetal status.    2.   term labor  without ROM  3.   Obstetrical history significant for is remarkable for  x 1  4.   GBS status:   Lab Results   Component Value Date    STREPGPB Negative 2025      5.   Rh: positive    Plan:  Expectant management  Continuous FHT and TOCO monitoring.   Diet: NPO  Desires katerina  Plan of care has been reviewed with patient and questions answered.  Risks, benefits of treatment plan have been discussed.   Consent obtained to proceed with labor management and subsequent delivery of baby.        Cherry Torrez CNM  3/23/2025  12:55 EDT

## 2025-03-23 LAB
BASOPHILS # BLD AUTO: 0.04 10*3/MM3 (ref 0–0.2)
BASOPHILS NFR BLD AUTO: 0.3 % (ref 0–1.5)
DEPRECATED RDW RBC AUTO: 45.2 FL (ref 37–54)
EOSINOPHIL # BLD AUTO: 0.04 10*3/MM3 (ref 0–0.4)
EOSINOPHIL NFR BLD AUTO: 0.3 % (ref 0.3–6.2)
ERYTHROCYTE [DISTWIDTH] IN BLOOD BY AUTOMATED COUNT: 14.6 % (ref 12.3–15.4)
HCT VFR BLD AUTO: 31.2 % (ref 34–46.6)
HGB BLD-MCNC: 9.8 G/DL (ref 12–15.9)
IMM GRANULOCYTES # BLD AUTO: 0.06 10*3/MM3 (ref 0–0.05)
IMM GRANULOCYTES NFR BLD AUTO: 0.5 % (ref 0–0.5)
LYMPHOCYTES # BLD AUTO: 2.76 10*3/MM3 (ref 0.7–3.1)
LYMPHOCYTES NFR BLD AUTO: 21.9 % (ref 19.6–45.3)
MCH RBC QN AUTO: 26.9 PG (ref 26.6–33)
MCHC RBC AUTO-ENTMCNC: 31.4 G/DL (ref 31.5–35.7)
MCV RBC AUTO: 85.7 FL (ref 79–97)
MONOCYTES # BLD AUTO: 1.1 10*3/MM3 (ref 0.1–0.9)
MONOCYTES NFR BLD AUTO: 8.7 % (ref 5–12)
NEUTROPHILS NFR BLD AUTO: 68.3 % (ref 42.7–76)
NEUTROPHILS NFR BLD AUTO: 8.62 10*3/MM3 (ref 1.7–7)
NRBC BLD AUTO-RTO: 0 /100 WBC (ref 0–0.2)
PLATELET # BLD AUTO: 230 10*3/MM3 (ref 140–450)
PMV BLD AUTO: 11.1 FL (ref 6–12)
RBC # BLD AUTO: 3.64 10*6/MM3 (ref 3.77–5.28)
WBC NRBC COR # BLD AUTO: 12.62 10*3/MM3 (ref 3.4–10.8)

## 2025-03-23 PROCEDURE — 85025 COMPLETE CBC W/AUTO DIFF WBC: CPT | Performed by: ADVANCED PRACTICE MIDWIFE

## 2025-03-23 RX ADMIN — DOCUSATE SODIUM 100 MG: 100 CAPSULE, LIQUID FILLED ORAL at 08:08

## 2025-03-23 RX ADMIN — IBUPROFEN 600 MG: 600 TABLET, FILM COATED ORAL at 14:13

## 2025-03-23 RX ADMIN — DOCUSATE SODIUM 100 MG: 100 CAPSULE, LIQUID FILLED ORAL at 21:21

## 2025-03-23 RX ADMIN — IBUPROFEN 600 MG: 600 TABLET, FILM COATED ORAL at 01:12

## 2025-03-23 NOTE — PLAN OF CARE
Problem: Adult Inpatient Plan of Care  Goal: Plan of Care Review  Outcome: Progressing  Goal: Patient-Specific Goal (Individualized)  Outcome: Progressing  Goal: Absence of Hospital-Acquired Illness or Injury  Outcome: Progressing  Goal: Optimal Comfort and Wellbeing  Outcome: Progressing  Goal: Readiness for Transition of Care  Outcome: Progressing     Problem: Skin Injury Risk Increased  Goal: Skin Health and Integrity  Outcome: Progressing     Problem: Labor  Goal: Hemostasis  Outcome: Met  Goal: Stable Fetal Wellbeing  Outcome: Met  Goal: Effective Progression to Delivery  Outcome: Met  Goal: Absence of Infection Signs and Symptoms  Outcome: Met  Goal: Acceptable Pain Control  Outcome: Met  Goal: Normal Uterine Contraction Pattern  Outcome: Met   Goal Outcome Evaluation:

## 2025-03-23 NOTE — ANESTHESIA POSTPROCEDURE EVALUATION
Patient: Charley Schneider    Procedure Summary       Date: 03/22/25 Room / Location:     Anesthesia Start: 1742 Anesthesia Stop: 1918    Procedure: LABOR ANALGESIA Diagnosis:     Scheduled Providers:  Provider: Chantal Carbajal DO    Anesthesia Type: epidural ASA Status: 2            Anesthesia Type: epidural    Vitals  Vitals Value Taken Time   /79 03/23/25 08:10   Temp 98.2 °F (36.8 °C) 03/23/25 08:10   Pulse 81 03/23/25 08:10   Resp 18 03/23/25 08:10   SpO2             Post Anesthesia Care and Evaluation    Patient location during evaluation: bedside  Patient participation: complete - patient participated  Level of consciousness: awake  Pain score: 0  Pain management: satisfactory to patient    Airway patency: patent  Anesthetic complications: No anesthetic complications  PONV Status: none  Cardiovascular status: acceptable and hemodynamically stable  Respiratory status: acceptable  Hydration status: acceptable  Post Neuraxial Block status: Motor and sensory function returned to baseline and No signs or symptoms of PDPH

## 2025-03-23 NOTE — PROGRESS NOTES
3/23/2025  PPD #1    Subjective   Charley feels well.  Patient describes her lochia as less than menses.  Pain is well controlled       Objective   Temp: Temp:  [97.9 °F (36.6 °C)-99 °F (37.2 °C)] 98.2 °F (36.8 °C) Temp src: Oral   BP: BP: (101-138)/(56-86) 136/79        Pulse: Heart Rate:  [] 81  RR: Resp:  [16-22] 18    General:  No acute distress   Abdomen: Fundus firm and beneath umbilicus   Pelvis: deferred     Lab Results   Component Value Date    WBC 12.62 (H) 03/23/2025    HGB 9.8 (L) 03/23/2025    HCT 31.2 (L) 03/23/2025    MCV 85.7 03/23/2025     03/23/2025    CREATININE 0.50 (L) 03/05/2018    AST 35 03/05/2018    ALT 47 03/05/2018    HEPBSAG Non-Reactive 09/24/2024     Results from last 7 days   Lab Units 03/22/25  1327   ABO TYPING  O   RH TYPING  Positive   ANTIBODY SCREEN  Negative       Assessment  PPD# 1 after vaginal delivery    Plan  Supportive care, anticipate d/c in am.      This note has been electronically signed.    Cherry Torrez CNM  12:55 EDT  March 23, 2025

## 2025-03-23 NOTE — LACTATION NOTE
This note was copied from a baby's chart.     25 0015   Maternal Information   Date of Referral 25   Person Making Referral lactation consultant  (courtesy visit, newly postpartum)   Maternal Reason for Referral breastfeeding currently   Infant Reason for Referral  infant   Maternal Assessment   Breast Size Issue none   Breast Shape Bilateral:;round   Breast Density Bilateral:;soft   Nipples Bilateral:;everted   Left Nipple Symptoms intact;nontender   Right Nipple Symptoms intact;nontender   Maternal Infant Feeding   Maternal Emotional State independent   Infant Positioning cradle  (right)   Signs of Milk Transfer audible swallow;deep jaw excursions noted   Pain with Feeding no   Latch Assistance none needed   Support Person Involvement verbally supports mother   Milk Expression/Equipment   Breast Pump Type manual pump;double electric, personal  (Old Medela at home with new parts)   Lactation Referrals   Lactation Referrals outpatient lactation program   Outpatient Lactation Program Lactation Follow-up Date/Time prn after discharge     Courtesy visit to newly postpartum couplet. Mom states she did not breast feed #1, did breast feed #2 and then exclusively pumped x 10 mo. With #3 as baby had difficulty latching. She does reports after the first nursing session her nipple was lipstick shaped. I encouraged Mom to start with nipple to nose and wait for wide gape and then sandwich as much nipple/areola into back roof of mouth. We tried cross cradle, but Mom was uncomfortable this way and changed back to cradle hold. She denies pain with this latch. Baby has deep jaw excursions and audible swallows. Encouraged Mom to call lactation if nipple becomes lip stick shaped again. Reviewed breast feeding tips and information handouts with parents. They verbalized understanding. Mom has a manual pump with her and an old Medela with new parts at home. Encouraged to call lactation with any questions/concerns.  Encouraged to call outpatient clinic prn after discharge.

## 2025-03-23 NOTE — LACTATION NOTE
03/23/25 0936   Maternal Information   Date of Referral 03/23/25   Person Making Referral lactation consultant  (Courtesy follow up visit. Pt reports infant is nursing well & she is independent with breastfeeding.)   Maternal Assessment   Left Nipple Symptoms intact;nontender   Right Nipple Symptoms intact;nontender  (per patient report)   Maternal Infant Feeding   Maternal Emotional State independent;receptive;relaxed   Pain with Feeding no   Milk Expression/Equipment   Breast Pump Type manual pump;double electric, personal  (Pt has a [employer] pu mp for home use. Reminded pt to pump for consistently short or missed feedings or if supplement is given.)

## 2025-03-24 VITALS
BODY MASS INDEX: 39.37 KG/M2 | TEMPERATURE: 97.4 F | RESPIRATION RATE: 18 BRPM | HEART RATE: 78 BPM | HEIGHT: 66 IN | SYSTOLIC BLOOD PRESSURE: 107 MMHG | WEIGHT: 245 LBS | DIASTOLIC BLOOD PRESSURE: 63 MMHG

## 2025-03-24 LAB — HCV AB SER QL: NORMAL

## 2025-03-24 PROCEDURE — 90471 IMMUNIZATION ADMIN: CPT | Performed by: OBSTETRICS & GYNECOLOGY

## 2025-03-24 PROCEDURE — 90707 MMR VACCINE SC: CPT | Performed by: OBSTETRICS & GYNECOLOGY

## 2025-03-24 PROCEDURE — 25010000002 MEASLES, MUMPS & RUBELLA VAC RECONSTITUTED SOLUTION: Performed by: OBSTETRICS & GYNECOLOGY

## 2025-03-24 RX ORDER — IBUPROFEN 600 MG/1
600 TABLET, FILM COATED ORAL EVERY 6 HOURS PRN
Qty: 60 TABLET | Refills: 0 | Status: SHIPPED | OUTPATIENT
Start: 2025-03-24

## 2025-03-24 RX ORDER — FERROUS SULFATE 325(65) MG
325 TABLET ORAL
Qty: 30 TABLET | Refills: 1 | Status: SHIPPED | OUTPATIENT
Start: 2025-03-24

## 2025-03-24 RX ORDER — PSEUDOEPHEDRINE HCL 30 MG
100 TABLET ORAL 2 TIMES DAILY PRN
Qty: 60 CAPSULE | Refills: 0 | Status: SHIPPED | OUTPATIENT
Start: 2025-03-24

## 2025-03-24 RX ADMIN — MEASLES, MUMPS, AND RUBELLA VIRUS VACCINE LIVE 0.5 ML: 1000; 12500; 1000 INJECTION, POWDER, LYOPHILIZED, FOR SUSPENSION SUBCUTANEOUS at 10:18

## 2025-03-24 RX ADMIN — DOCUSATE SODIUM 100 MG: 100 CAPSULE, LIQUID FILLED ORAL at 08:55

## 2025-03-24 NOTE — PROGRESS NOTES
3/24/2025  PPD #2    Subjective   Charley feels well.  Patient describes her lochia as less than menses.  Pain is well controlled       Objective   Temp: Temp:  [97.4 °F (36.3 °C)-98.7 °F (37.1 °C)] 97.4 °F (36.3 °C) Temp src: Oral   BP: BP: (107-125)/(63-82) 107/63        Pulse: Heart Rate:  [69-81] 78  RR: Resp:  [16-18] 18    General:  No acute distress   Abdomen: Fundus firm and beneath umbilicus   Pelvis: deferred     Lab Results   Component Value Date    WBC 12.62 (H) 03/23/2025    HGB 9.8 (L) 03/23/2025    HCT 31.2 (L) 03/23/2025    MCV 85.7 03/23/2025     03/23/2025    HEPBSAG Non-Reactive 09/24/2024    AST 35 03/05/2018    ALT 47 03/05/2018       Assessment  PPD# 2 after vaginal delivery  PP anemia - asymptomatic    Plan  Discharge to home  Follow up with LW  in 6 weeks  Continue PO iron  Advised no tampons, intercourse, or tub baths for 6 weeks.       This note has been electronically signed.    Ewa Tomas MD  08:32 EDT  March 24, 2025

## 2025-03-24 NOTE — DISCHARGE SUMMARY
[facility]  Vaginal Delivery Discharge Summary      Patient: Charley Schneider      MR#:[id number]  Admission  Diagnosis: IUP @ 38w5d, labor  Discharge Diagnosis: PPD#2 s/p  @ 38w5d, PP anemia    Date of Admission: 3/22/2025  Date of Discharge:  3/24/2025    Procedures:  Vaginal, Spontaneous    3/22/2025   7:12 PM     Service:  Obstetrics    Hospital Course:  Patient underwent vaginal delivery and remained in the hospital for 2 days.  During that time she remained afebrile and hemodynamically stable.  On the day of discharge, she was eating, ambulating and voiding without difficulty.      Lab Results   Component Value Date    WBC 12.62 (H) 2025    HGB 9.8 (L) 2025    HCT 31.2 (L) 2025    MCV 85.7 2025     2025    CREATININE 0.50 (L) 2018    AST 35 2018    ALT 47 2018     Results from last 7 days   Lab Units 25  1327   ABO TYPING  O   RH TYPING  Positive   ANTIBODY SCREEN  Negative       Discharge Medications     Discharge Medications        New Medications        Instructions Start Date   docusate sodium 100 MG capsule   100 mg, Oral, 2 Times Daily PRN      ferrous sulfate 325 (65 FE) MG tablet   325 mg, Oral, Daily With Breakfast      ibuprofen 600 MG tablet  Commonly known as: ADVIL,MOTRIN   600 mg, Oral, Every 6 Hours PRN             Continue These Medications        Instructions Start Date   acetaminophen 500 MG tablet  Commonly known as: TYLENOL   500 mg, Oral, Every 6 Hours PRN      prenatal (CLASSIC) vitamin 28-0.8 MG tablet tablet  Generic drug: prenatal vitamin   1 tablet, Daily               Discharge Disposition:  To Home    Discharge Condition:  Stable    Discharge Diet: Regular    Activity at Discharge: Pelvic rest    Follow-up Appointments  6 weeks      Ewa Tomas MD  25  08:35 EDT

## 2025-03-24 NOTE — PLAN OF CARE
Goal Outcome Evaluation:  Plan of Care Reviewed With: patient        Progress: improving  Outcome Evaluation: VSS, lochia/fundus WDL, patient reports no pain, ambulating ad karley, discharge teaching complete and discharging today.

## 2025-03-24 NOTE — LACTATION NOTE
03/24/25 0853   Maternal Information   Date of Referral 03/24/25   Person Making Referral lactation consultant  (courtesy prior to discharge)   Maternal Reason for Referral other (see comments)  (mother reporting well with infant nursing; stated her milk is in; provided supplies for home and encouraged gentle massage/hand expression or pumping to relieve any discomfort; to call lactation PRN.)   Lactation Referrals   Lactation Referrals outpatient lactation program   Outpatient Lactation Program Lactation Follow-up Date/Time prn after discharge

## 2025-03-25 ENCOUNTER — MATERNAL SCREENING (OUTPATIENT)
Dept: CALL CENTER | Facility: HOSPITAL | Age: 25
End: 2025-03-25
Payer: COMMERCIAL

## 2025-03-25 NOTE — OUTREACH NOTE
Maternal Screening Survey      Flowsheet Row Responses   Eligibility Eligible   Prep survey completed? Yes   Facility patient discharged from? [facility] - Registered Nurse

## 2025-04-02 ENCOUNTER — MATERNAL SCREENING (OUTPATIENT)
Dept: CALL CENTER | Facility: HOSPITAL | Age: 25
End: 2025-04-02
Payer: COMMERCIAL

## 2025-04-02 NOTE — OUTREACH NOTE
Maternal Screening Survey      Flowsheet Row Responses   [facility] discharged from? [facility]   Attempt successful? No   Unsuccessful attempts Attempt 1              [facility] - Registered Nurse

## 2025-04-02 NOTE — OUTREACH NOTE
Maternal Screening Survey      Flowsheet Row Responses   [facility] discharged from? [facility]   Attempt successful? No   Unsuccessful attempts Attempt 2              [facility] - Registered Nurse

## 2025-04-03 ENCOUNTER — MATERNAL SCREENING (OUTPATIENT)
Dept: CALL CENTER | Facility: HOSPITAL | Age: 25
End: 2025-04-03
Payer: COMMERCIAL

## 2025-04-03 NOTE — OUTREACH NOTE
Maternal Screening Survey      Flowsheet Row Responses   Facility [facility] discharged from? [facility]   Attempt successful? No   Unsuccessful attempts Attempt 3   oke Unable to reach              [facility] SANDERS - Registered Nurse